# Patient Record
Sex: MALE | Race: WHITE | NOT HISPANIC OR LATINO
[De-identification: names, ages, dates, MRNs, and addresses within clinical notes are randomized per-mention and may not be internally consistent; named-entity substitution may affect disease eponyms.]

---

## 2021-12-10 PROBLEM — Z00.00 ENCOUNTER FOR PREVENTIVE HEALTH EXAMINATION: Status: ACTIVE | Noted: 2021-12-10

## 2021-12-16 ENCOUNTER — RESULT REVIEW (OUTPATIENT)
Age: 86
End: 2021-12-16

## 2021-12-20 ENCOUNTER — APPOINTMENT (OUTPATIENT)
Dept: CARDIOTHORACIC SURGERY | Facility: CLINIC | Age: 86
End: 2021-12-20

## 2022-01-03 ENCOUNTER — APPOINTMENT (OUTPATIENT)
Dept: CARDIOTHORACIC SURGERY | Facility: CLINIC | Age: 87
End: 2022-01-03
Payer: COMMERCIAL

## 2022-01-03 DIAGNOSIS — Z86.2 PERSONAL HISTORY OF DISEASES OF THE BLOOD AND BLOOD-FORMING ORGANS AND CERTAIN DISORDERS INVOLVING THE IMMUNE MECHANISM: ICD-10-CM

## 2022-01-03 DIAGNOSIS — Z86.79 PERSONAL HISTORY OF OTHER DISEASES OF THE CIRCULATORY SYSTEM: ICD-10-CM

## 2022-01-03 DIAGNOSIS — Z86.39 PERSONAL HISTORY OF OTHER ENDOCRINE, NUTRITIONAL AND METABOLIC DISEASE: ICD-10-CM

## 2022-01-03 DIAGNOSIS — Z85.038 PERSONAL HISTORY OF OTHER MALIGNANT NEOPLASM OF LARGE INTESTINE: ICD-10-CM

## 2022-01-03 DIAGNOSIS — Z86.718 PERSONAL HISTORY OF OTHER VENOUS THROMBOSIS AND EMBOLISM: ICD-10-CM

## 2022-01-03 PROCEDURE — 99202 OFFICE O/P NEW SF 15 MIN: CPT | Mod: 95

## 2022-01-03 RX ORDER — WARFARIN 5 MG/1
5 TABLET ORAL DAILY
Refills: 0 | Status: ACTIVE | COMMUNITY

## 2022-01-03 RX ORDER — FERUMOXYTOL 510 MG/17ML
510 INJECTION INTRAVENOUS
Refills: 0 | Status: ACTIVE | COMMUNITY

## 2022-01-03 RX ORDER — AMLODIPINE BESYLATE 5 MG/1
5 TABLET ORAL DAILY
Qty: 30 | Refills: 0 | Status: ACTIVE | COMMUNITY

## 2022-01-03 RX ORDER — LISINOPRIL 20 MG/1
20 TABLET ORAL DAILY
Qty: 3 | Refills: 3 | Status: ACTIVE | COMMUNITY

## 2022-01-03 RX ORDER — CYANOCOBALAMIN 1000 UG/ML
1000 INJECTION INTRAMUSCULAR; SUBCUTANEOUS
Qty: 4 | Refills: 3 | Status: ACTIVE | COMMUNITY

## 2022-01-03 RX ORDER — SUCRALFATE 1 G/1
1 TABLET ORAL 4 TIMES DAILY
Refills: 0 | Status: ACTIVE | COMMUNITY

## 2022-01-06 NOTE — HISTORY OF PRESENT ILLNESS
[FreeTextEntry1] : This visit was provided via telehealth using real-time 2-way audio visual technology. The patient, BERNARD CHENEY, was located at home, [address] [place] [address], at the time of the visit. The provider was located at [address] [place] [address]. The patient, BERNARD CHENEY, SHAHRAM WOLF, and Dr. Thompson all participated in the telehealth encounter. 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 \par \par The patient states he has been feeling fatigued with some dyspnea on exertion for the last few months.  His daughter states that he has less energy overall.  He denies chest pain, shortness of breath at rest, dizziness, syncope, orthopnea, PND, or LE edema.  \par \par The patient underwent a cardiac Catherization on 11/29/21 which showed normal coronaries.  The patient had an ECHO that showed severe aortic stenosis with a mean gradient of 44 mmHg.

## 2022-02-18 ENCOUNTER — NON-APPOINTMENT (OUTPATIENT)
Age: 87
End: 2022-02-18

## 2022-02-18 VITALS
DIASTOLIC BLOOD PRESSURE: 68 MMHG | HEART RATE: 73 BPM | OXYGEN SATURATION: 100 % | RESPIRATION RATE: 16 BRPM | SYSTOLIC BLOOD PRESSURE: 155 MMHG | WEIGHT: 180.34 LBS | TEMPERATURE: 98 F | HEIGHT: 67 IN

## 2022-02-18 NOTE — PATIENT PROFILE ADULT - FALL HARM RISK - HARM RISK INTERVENTIONS

## 2022-02-21 ENCOUNTER — TRANSCRIPTION ENCOUNTER (OUTPATIENT)
Age: 87
End: 2022-02-21

## 2022-02-22 ENCOUNTER — APPOINTMENT (OUTPATIENT)
Dept: CARDIOTHORACIC SURGERY | Facility: CLINIC | Age: 87
End: 2022-02-22
Payer: COMMERCIAL

## 2022-02-22 ENCOUNTER — APPOINTMENT (OUTPATIENT)
Dept: CARDIOTHORACIC SURGERY | Facility: HOSPITAL | Age: 87
End: 2022-02-22

## 2022-02-22 ENCOUNTER — INPATIENT (INPATIENT)
Facility: HOSPITAL | Age: 87
LOS: 0 days | Discharge: HOME HEALTH | DRG: 267 | End: 2022-02-23
Attending: INTERNAL MEDICINE | Admitting: INTERNAL MEDICINE
Payer: MEDICARE

## 2022-02-22 DIAGNOSIS — K25.9 GASTRIC ULCER, UNSPECIFIED AS ACUTE OR CHRONIC, WITHOUT HEMORRHAGE OR PERFORATION: Chronic | ICD-10-CM

## 2022-02-22 LAB
A1C WITH ESTIMATED AVERAGE GLUCOSE RESULT: 4.6 % — SIGNIFICANT CHANGE UP (ref 4–5.6)
ALBUMIN SERPL ELPH-MCNC: 3.1 G/DL — LOW (ref 3.3–5)
ALBUMIN SERPL ELPH-MCNC: 3.8 G/DL — SIGNIFICANT CHANGE UP (ref 3.3–5)
ALP SERPL-CCNC: 67 U/L — SIGNIFICANT CHANGE UP (ref 40–120)
ALP SERPL-CCNC: 76 U/L — SIGNIFICANT CHANGE UP (ref 40–120)
ALT FLD-CCNC: 17 U/L — SIGNIFICANT CHANGE UP (ref 10–45)
ALT FLD-CCNC: 20 U/L — SIGNIFICANT CHANGE UP (ref 10–45)
ANION GAP SERPL CALC-SCNC: 10 MMOL/L — SIGNIFICANT CHANGE UP (ref 5–17)
ANION GAP SERPL CALC-SCNC: 12 MMOL/L — SIGNIFICANT CHANGE UP (ref 5–17)
APPEARANCE UR: CLEAR — SIGNIFICANT CHANGE UP
APTT BLD: 31.4 SEC — SIGNIFICANT CHANGE UP (ref 27.5–35.5)
APTT BLD: 48 SEC — HIGH (ref 27.5–35.5)
AST SERPL-CCNC: 29 U/L — SIGNIFICANT CHANGE UP (ref 10–40)
AST SERPL-CCNC: 30 U/L — SIGNIFICANT CHANGE UP (ref 10–40)
BASOPHILS # BLD AUTO: 0.02 K/UL — SIGNIFICANT CHANGE UP (ref 0–0.2)
BASOPHILS NFR BLD AUTO: 0.3 % — SIGNIFICANT CHANGE UP (ref 0–2)
BILIRUB SERPL-MCNC: 1 MG/DL — SIGNIFICANT CHANGE UP (ref 0.2–1.2)
BILIRUB SERPL-MCNC: 1.5 MG/DL — HIGH (ref 0.2–1.2)
BILIRUB UR-MCNC: NEGATIVE — SIGNIFICANT CHANGE UP
BLD GP AB SCN SERPL QL: NEGATIVE — SIGNIFICANT CHANGE UP
BUN SERPL-MCNC: 12 MG/DL — SIGNIFICANT CHANGE UP (ref 7–23)
BUN SERPL-MCNC: 9 MG/DL — SIGNIFICANT CHANGE UP (ref 7–23)
CALCIUM SERPL-MCNC: 8.4 MG/DL — SIGNIFICANT CHANGE UP (ref 8.4–10.5)
CALCIUM SERPL-MCNC: 9.3 MG/DL — SIGNIFICANT CHANGE UP (ref 8.4–10.5)
CHLORIDE SERPL-SCNC: 95 MMOL/L — LOW (ref 96–108)
CHLORIDE SERPL-SCNC: 98 MMOL/L — SIGNIFICANT CHANGE UP (ref 96–108)
CO2 SERPL-SCNC: 22 MMOL/L — SIGNIFICANT CHANGE UP (ref 22–31)
CO2 SERPL-SCNC: 22 MMOL/L — SIGNIFICANT CHANGE UP (ref 22–31)
COLOR SPEC: YELLOW — SIGNIFICANT CHANGE UP
CREAT SERPL-MCNC: 0.73 MG/DL — SIGNIFICANT CHANGE UP (ref 0.5–1.3)
CREAT SERPL-MCNC: 0.97 MG/DL — SIGNIFICANT CHANGE UP (ref 0.5–1.3)
DIFF PNL FLD: NEGATIVE — SIGNIFICANT CHANGE UP
EOSINOPHIL # BLD AUTO: 0.14 K/UL — SIGNIFICANT CHANGE UP (ref 0–0.5)
EOSINOPHIL NFR BLD AUTO: 2.3 % — SIGNIFICANT CHANGE UP (ref 0–6)
ESTIMATED AVERAGE GLUCOSE: 85 MG/DL — SIGNIFICANT CHANGE UP (ref 68–114)
GAS PNL BLDA: SIGNIFICANT CHANGE UP
GLUCOSE SERPL-MCNC: 109 MG/DL — HIGH (ref 70–99)
GLUCOSE SERPL-MCNC: 99 MG/DL — SIGNIFICANT CHANGE UP (ref 70–99)
GLUCOSE UR QL: NEGATIVE — SIGNIFICANT CHANGE UP
HCT VFR BLD CALC: 35.6 % — LOW (ref 39–50)
HCT VFR BLD CALC: 37 % — LOW (ref 39–50)
HGB BLD-MCNC: 12.2 G/DL — LOW (ref 13–17)
HGB BLD-MCNC: 12.5 G/DL — LOW (ref 13–17)
IMM GRANULOCYTES NFR BLD AUTO: 0.5 % — SIGNIFICANT CHANGE UP (ref 0–1.5)
INR BLD: 1 — SIGNIFICANT CHANGE UP (ref 0.88–1.16)
INR BLD: 1.16 — SIGNIFICANT CHANGE UP (ref 0.88–1.16)
KETONES UR-MCNC: NEGATIVE — SIGNIFICANT CHANGE UP
LEUKOCYTE ESTERASE UR-ACNC: NEGATIVE — SIGNIFICANT CHANGE UP
LYMPHOCYTES # BLD AUTO: 0.63 K/UL — LOW (ref 1–3.3)
LYMPHOCYTES # BLD AUTO: 10.4 % — LOW (ref 13–44)
MAGNESIUM SERPL-MCNC: 1.6 MG/DL — SIGNIFICANT CHANGE UP (ref 1.6–2.6)
MAGNESIUM SERPL-MCNC: 1.7 MG/DL — SIGNIFICANT CHANGE UP (ref 1.6–2.6)
MCHC RBC-ENTMCNC: 33.5 PG — SIGNIFICANT CHANGE UP (ref 27–34)
MCHC RBC-ENTMCNC: 33.6 PG — SIGNIFICANT CHANGE UP (ref 27–34)
MCHC RBC-ENTMCNC: 33.8 GM/DL — SIGNIFICANT CHANGE UP (ref 32–36)
MCHC RBC-ENTMCNC: 34.3 GM/DL — SIGNIFICANT CHANGE UP (ref 32–36)
MCV RBC AUTO: 98.1 FL — SIGNIFICANT CHANGE UP (ref 80–100)
MCV RBC AUTO: 99.2 FL — SIGNIFICANT CHANGE UP (ref 80–100)
MONOCYTES # BLD AUTO: 0.44 K/UL — SIGNIFICANT CHANGE UP (ref 0–0.9)
MONOCYTES NFR BLD AUTO: 7.2 % — SIGNIFICANT CHANGE UP (ref 2–14)
NEUTROPHILS # BLD AUTO: 4.82 K/UL — SIGNIFICANT CHANGE UP (ref 1.8–7.4)
NEUTROPHILS NFR BLD AUTO: 79.3 % — HIGH (ref 43–77)
NITRITE UR-MCNC: NEGATIVE — SIGNIFICANT CHANGE UP
NRBC # BLD: 0 /100 WBCS — SIGNIFICANT CHANGE UP (ref 0–0)
NRBC # BLD: 0 /100 WBCS — SIGNIFICANT CHANGE UP (ref 0–0)
NT-PROBNP SERPL-SCNC: 348 PG/ML — HIGH (ref 0–300)
PH UR: 6.5 — SIGNIFICANT CHANGE UP (ref 5–8)
PHOSPHATE SERPL-MCNC: 2.8 MG/DL — SIGNIFICANT CHANGE UP (ref 2.5–4.5)
PHOSPHATE SERPL-MCNC: 2.8 MG/DL — SIGNIFICANT CHANGE UP (ref 2.5–4.5)
PLATELET # BLD AUTO: 161 K/UL — SIGNIFICANT CHANGE UP (ref 150–400)
PLATELET # BLD AUTO: 210 K/UL — SIGNIFICANT CHANGE UP (ref 150–400)
POTASSIUM SERPL-MCNC: 3.8 MMOL/L — SIGNIFICANT CHANGE UP (ref 3.5–5.3)
POTASSIUM SERPL-MCNC: 4.2 MMOL/L — SIGNIFICANT CHANGE UP (ref 3.5–5.3)
POTASSIUM SERPL-SCNC: 3.8 MMOL/L — SIGNIFICANT CHANGE UP (ref 3.5–5.3)
POTASSIUM SERPL-SCNC: 4.2 MMOL/L — SIGNIFICANT CHANGE UP (ref 3.5–5.3)
PROT SERPL-MCNC: 6 G/DL — SIGNIFICANT CHANGE UP (ref 6–8.3)
PROT SERPL-MCNC: 6.6 G/DL — SIGNIFICANT CHANGE UP (ref 6–8.3)
PROT UR-MCNC: NEGATIVE MG/DL — SIGNIFICANT CHANGE UP
PROTHROM AB SERPL-ACNC: 12 SEC — SIGNIFICANT CHANGE UP (ref 10.6–13.6)
PROTHROM AB SERPL-ACNC: 13.8 SEC — HIGH (ref 10.6–13.6)
RBC # BLD: 3.63 M/UL — LOW (ref 4.2–5.8)
RBC # BLD: 3.73 M/UL — LOW (ref 4.2–5.8)
RBC # FLD: 13.5 % — SIGNIFICANT CHANGE UP (ref 10.3–14.5)
RBC # FLD: 13.6 % — SIGNIFICANT CHANGE UP (ref 10.3–14.5)
RH IG SCN BLD-IMP: NEGATIVE — SIGNIFICANT CHANGE UP
RH IG SCN BLD-IMP: NEGATIVE — SIGNIFICANT CHANGE UP
SODIUM SERPL-SCNC: 129 MMOL/L — LOW (ref 135–145)
SODIUM SERPL-SCNC: 130 MMOL/L — LOW (ref 135–145)
SP GR SPEC: 1.01 — SIGNIFICANT CHANGE UP (ref 1–1.03)
TSH SERPL-MCNC: 2.89 UIU/ML — SIGNIFICANT CHANGE UP (ref 0.27–4.2)
UROBILINOGEN FLD QL: 0.2 E.U./DL — SIGNIFICANT CHANGE UP
WBC # BLD: 5.62 K/UL — SIGNIFICANT CHANGE UP (ref 3.8–10.5)
WBC # BLD: 6.08 K/UL — SIGNIFICANT CHANGE UP (ref 3.8–10.5)
WBC # FLD AUTO: 5.62 K/UL — SIGNIFICANT CHANGE UP (ref 3.8–10.5)
WBC # FLD AUTO: 6.08 K/UL — SIGNIFICANT CHANGE UP (ref 3.8–10.5)

## 2022-02-22 PROCEDURE — MCOT1: CPT | Mod: NC

## 2022-02-22 PROCEDURE — 33361 REPLACE AORTIC VALVE PERQ: CPT | Mod: 62,Q0

## 2022-02-22 PROCEDURE — 71045 X-RAY EXAM CHEST 1 VIEW: CPT | Mod: 26

## 2022-02-22 DEVICE — GUIDEWIRE GLIDEWIRE ANGLED TIP 0.035" X 180CM STIFF: Type: IMPLANTABLE DEVICE | Status: FUNCTIONAL

## 2022-02-22 DEVICE — INTRO FLEXOR CHECK RAABE 5FR X 55CM: Type: IMPLANTABLE DEVICE | Status: FUNCTIONAL

## 2022-02-22 DEVICE — CHEST DRAIN THORACIC PVC 32FR RIGHT ANGLE: Type: IMPLANTABLE DEVICE | Status: FUNCTIONAL

## 2022-02-22 DEVICE — SYS CEREBRAL PROT NCT04149535 SENTINEL FOR STUDY ONLY: Type: IMPLANTABLE DEVICE | Status: FUNCTIONAL

## 2022-02-22 DEVICE — SHEATH INTRODUCER TERUMO PINNACLE CORONARY 7FR X 10CM X 0.038" MINI WIRE: Type: IMPLANTABLE DEVICE | Status: FUNCTIONAL

## 2022-02-22 DEVICE — GWIRE GUID  0.035INX150CM: Type: IMPLANTABLE DEVICE | Status: FUNCTIONAL

## 2022-02-22 DEVICE — INTRODUCER SHEATH KIT GLIDESHEATH SLENDER FLEX STRAIGHT 21G 6F X 10CM: Type: IMPLANTABLE DEVICE | Status: FUNCTIONAL

## 2022-02-22 DEVICE — EMERALD GUIDEWIRE 0.35: Type: IMPLANTABLE DEVICE | Status: FUNCTIONAL

## 2022-02-22 DEVICE — CANNULA RETROGRADE CARDIOPLEGIA SELF-INFLATING 14FR PRE-SHAPED STYLET/HANDLE: Type: IMPLANTABLE DEVICE | Status: FUNCTIONAL

## 2022-02-22 DEVICE — CHEST DRAIN THORACIC PVC 32FR: Type: IMPLANTABLE DEVICE | Status: FUNCTIONAL

## 2022-02-22 DEVICE — CATH VENOUS CONN 29/37FRX15X0.5IN: Type: IMPLANTABLE DEVICE | Status: FUNCTIONAL

## 2022-02-22 DEVICE — INTRODUCER SHEATH DRYSEAL FLEX 18FR X 33CM: Type: IMPLANTABLE DEVICE | Status: FUNCTIONAL

## 2022-02-22 DEVICE — GUIDEWIRE STANDARD STRAIGHT .035" X 180CM: Type: IMPLANTABLE DEVICE | Status: FUNCTIONAL

## 2022-02-22 DEVICE — LUKENS BONE WAX 2.5G: Type: IMPLANTABLE DEVICE | Status: FUNCTIONAL

## 2022-02-22 DEVICE — IMPLANTABLE DEVICE: Type: IMPLANTABLE DEVICE | Status: FUNCTIONAL

## 2022-02-22 DEVICE — WIREGUIDE TOROFLEX .018X40CM: Type: IMPLANTABLE DEVICE | Status: FUNCTIONAL

## 2022-02-22 DEVICE — CANNULA AORTIC ROOT WITH VENT LINE 14G X 14CM FLANGED: Type: IMPLANTABLE DEVICE | Status: FUNCTIONAL

## 2022-02-22 DEVICE — INTRO MICROPUNC 4FRX10CM SS: Type: IMPLANTABLE DEVICE | Status: FUNCTIONAL

## 2022-02-22 DEVICE — CATH DX JR4 INFIN 5FRX100CM: Type: IMPLANTABLE DEVICE | Status: FUNCTIONAL

## 2022-02-22 DEVICE — SHEATH INTRODUCER TERUMO PINNACLE CORONARY 6FR X 10CM X 0.038" MINI WIRE: Type: IMPLANTABLE DEVICE | Status: FUNCTIONAL

## 2022-02-22 DEVICE — SHEATH INTRODUCER TERUMO PINNACLE PERIPHERAL 5FR X 10CM X 0.035" MINI WIRE: Type: IMPLANTABLE DEVICE | Status: FUNCTIONAL

## 2022-02-22 DEVICE — CATH DX AL3 INFIN 5FRX100CM: Type: IMPLANTABLE DEVICE | Status: FUNCTIONAL

## 2022-02-22 DEVICE — KIT PACE ELCTR BIPOLAR 5FR: Type: IMPLANTABLE DEVICE | Status: FUNCTIONAL

## 2022-02-22 DEVICE — SUT PERCLOSE PROGLIDE 6FR: Type: IMPLANTABLE DEVICE | Status: FUNCTIONAL

## 2022-02-22 DEVICE — SHEATH INTRODUCER TERUMO PINNACLE CORONARY 10FR X 10CM X 0.038" MINI WIRE: Type: IMPLANTABLE DEVICE | Status: FUNCTIONAL

## 2022-02-22 DEVICE — WIRE ASAHI GRAND SLAM 300CM: Type: IMPLANTABLE DEVICE | Status: FUNCTIONAL

## 2022-02-22 DEVICE — GWIRE VASC J TIP 035X260: Type: IMPLANTABLE DEVICE | Status: FUNCTIONAL

## 2022-02-22 DEVICE — SHEATH INTRO DRYSEAL FLEX 16FR 33CM: Type: IMPLANTABLE DEVICE | Status: FUNCTIONAL

## 2022-02-22 DEVICE — PACING CATH PACEL RIGHT HEART CURVE 5FR KIT: Type: IMPLANTABLE DEVICE | Status: FUNCTIONAL

## 2022-02-22 DEVICE — CHEST DRAIN THORACIC PVC 28FR RIGHT ANGLE: Type: IMPLANTABLE DEVICE | Status: FUNCTIONAL

## 2022-02-22 DEVICE — WIRE GD CONFIDA BRECKER CRV .035X260: Type: IMPLANTABLE DEVICE | Status: FUNCTIONAL

## 2022-02-22 DEVICE — SHEATH INTRO DRYSEAL FLEX 14FR 33CM: Type: IMPLANTABLE DEVICE | Status: FUNCTIONAL

## 2022-02-22 DEVICE — GUIDEWIRE AMPLATZ EXTRA-STIFF CURVED .035" X 260CM: Type: IMPLANTABLE DEVICE | Status: FUNCTIONAL

## 2022-02-22 DEVICE — CATH DX JL4 INFIN 5FRX100CM: Type: IMPLANTABLE DEVICE | Status: FUNCTIONAL

## 2022-02-22 DEVICE — CATH DX AL1 INFIN 5FRX100CM: Type: IMPLANTABLE DEVICE | Status: FUNCTIONAL

## 2022-02-22 DEVICE — WIRE GD BMW UNIV II 190CM: Type: IMPLANTABLE DEVICE | Status: FUNCTIONAL

## 2022-02-22 DEVICE — CATH DX PIG 145 INFIN 5FRX110CM: Type: IMPLANTABLE DEVICE | Status: FUNCTIONAL

## 2022-02-22 DEVICE — SHEATH INTRODUCER TERUMO PINNACLE CORONARY 8FR X 10CM X 0.038" MINI WIRE: Type: IMPLANTABLE DEVICE | Status: FUNCTIONAL

## 2022-02-22 DEVICE — CANNULA EASY FLOW AORTIC 23FR: Type: IMPLANTABLE DEVICE | Status: FUNCTIONAL

## 2022-02-22 DEVICE — GUIDEWIRE TERUMO GLIDEWIRE STIFF STRAGHT .035" X 180CM: Type: IMPLANTABLE DEVICE | Status: FUNCTIONAL

## 2022-02-22 DEVICE — PACING WIRE STREAMLINE BIPOLAR MYOCARDIAL: Type: IMPLANTABLE DEVICE | Status: FUNCTIONAL

## 2022-02-22 DEVICE — CATH VENT LEFT HEART SILICONE 20FR NON-VENTED: Type: IMPLANTABLE DEVICE | Status: FUNCTIONAL

## 2022-02-22 DEVICE — INTRO GWIRE 0.009-0.018IN: Type: IMPLANTABLE DEVICE | Status: FUNCTIONAL

## 2022-02-22 DEVICE — SHEATH INTRODUCER TERUMO PINNACLE CORONARY 5FR X 10CM X 0.038" MINI WIRE: Type: IMPLANTABLE DEVICE | Status: FUNCTIONAL

## 2022-02-22 RX ORDER — WARFARIN SODIUM 2.5 MG/1
5 TABLET ORAL ONCE
Refills: 0 | Status: DISCONTINUED | OUTPATIENT
Start: 2022-02-22 | End: 2022-02-22

## 2022-02-22 RX ORDER — ASPIRIN/CALCIUM CARB/MAGNESIUM 324 MG
81 TABLET ORAL DAILY
Refills: 0 | Status: DISCONTINUED | OUTPATIENT
Start: 2022-02-22 | End: 2022-02-22

## 2022-02-22 RX ORDER — HEPARIN SODIUM 5000 [USP'U]/ML
5000 INJECTION INTRAVENOUS; SUBCUTANEOUS EVERY 8 HOURS
Refills: 0 | Status: DISCONTINUED | OUTPATIENT
Start: 2022-02-22 | End: 2022-02-23

## 2022-02-22 RX ORDER — DEXTROSE 50 % IN WATER 50 %
25 SYRINGE (ML) INTRAVENOUS
Refills: 0 | Status: DISCONTINUED | OUTPATIENT
Start: 2022-02-22 | End: 2022-02-22

## 2022-02-22 RX ORDER — PREGABALIN 225 MG/1
0 CAPSULE ORAL
Qty: 0 | Refills: 0 | DISCHARGE

## 2022-02-22 RX ORDER — INSULIN HUMAN 100 [IU]/ML
1 INJECTION, SOLUTION SUBCUTANEOUS
Qty: 50 | Refills: 0 | Status: DISCONTINUED | OUTPATIENT
Start: 2022-02-22 | End: 2022-02-22

## 2022-02-22 RX ORDER — CEFAZOLIN SODIUM 1 G
2000 VIAL (EA) INJECTION EVERY 8 HOURS
Refills: 0 | Status: DISCONTINUED | OUTPATIENT
Start: 2022-02-22 | End: 2022-02-23

## 2022-02-22 RX ORDER — WARFARIN SODIUM 2.5 MG/1
5 TABLET ORAL ONCE
Refills: 0 | Status: COMPLETED | OUTPATIENT
Start: 2022-02-22 | End: 2022-02-22

## 2022-02-22 RX ORDER — DEXTROSE 50 % IN WATER 50 %
50 SYRINGE (ML) INTRAVENOUS
Refills: 0 | Status: DISCONTINUED | OUTPATIENT
Start: 2022-02-22 | End: 2022-02-22

## 2022-02-22 RX ORDER — PANTOPRAZOLE SODIUM 20 MG/1
40 TABLET, DELAYED RELEASE ORAL DAILY
Refills: 0 | Status: DISCONTINUED | OUTPATIENT
Start: 2022-02-22 | End: 2022-02-22

## 2022-02-22 RX ORDER — PANTOPRAZOLE SODIUM 20 MG/1
40 TABLET, DELAYED RELEASE ORAL
Refills: 0 | Status: DISCONTINUED | OUTPATIENT
Start: 2022-02-23 | End: 2022-02-23

## 2022-02-22 RX ORDER — AMLODIPINE BESYLATE 2.5 MG/1
5 TABLET ORAL DAILY
Refills: 0 | Status: DISCONTINUED | OUTPATIENT
Start: 2022-02-22 | End: 2022-02-23

## 2022-02-22 RX ORDER — FERUMOXYTOL 510 MG/17ML
510 INJECTION INTRAVENOUS
Qty: 0 | Refills: 0 | DISCHARGE

## 2022-02-22 RX ORDER — POTASSIUM CHLORIDE 20 MEQ
20 PACKET (EA) ORAL ONCE
Refills: 0 | Status: COMPLETED | OUTPATIENT
Start: 2022-02-22 | End: 2022-02-22

## 2022-02-22 RX ORDER — MAGNESIUM SULFATE 500 MG/ML
2 VIAL (ML) INJECTION ONCE
Refills: 0 | Status: COMPLETED | OUTPATIENT
Start: 2022-02-22 | End: 2022-02-22

## 2022-02-22 RX ORDER — ACETAMINOPHEN 500 MG
650 TABLET ORAL EVERY 6 HOURS
Refills: 0 | Status: DISCONTINUED | OUTPATIENT
Start: 2022-02-22 | End: 2022-02-23

## 2022-02-22 RX ADMIN — Medication 100 MILLIGRAM(S): at 22:49

## 2022-02-22 RX ADMIN — PANTOPRAZOLE SODIUM 40 MILLIGRAM(S): 20 TABLET, DELAYED RELEASE ORAL at 14:41

## 2022-02-22 RX ADMIN — WARFARIN SODIUM 5 MILLIGRAM(S): 2.5 TABLET ORAL at 22:43

## 2022-02-22 RX ADMIN — AMLODIPINE BESYLATE 5 MILLIGRAM(S): 2.5 TABLET ORAL at 20:47

## 2022-02-22 RX ADMIN — Medication 25 GRAM(S): at 14:42

## 2022-02-22 RX ADMIN — HEPARIN SODIUM 5000 UNIT(S): 5000 INJECTION INTRAVENOUS; SUBCUTANEOUS at 22:43

## 2022-02-22 RX ADMIN — Medication 100 MILLIGRAM(S): at 14:42

## 2022-02-22 RX ADMIN — Medication 81 MILLIGRAM(S): at 10:44

## 2022-02-22 RX ADMIN — Medication 20 MILLIEQUIVALENT(S): at 15:17

## 2022-02-22 NOTE — H&P ADULT - NSHPPHYSICALEXAM_GEN_ALL_CORE
Appearance: No acute distress.  Neurologic: AAOx3, no AMS or focal deficits.  Responds appropriately to verbal and physical stimuli; exhibits purposeful movement in all extremities.  HEENT:   MMM, PERRLA, EOMI	b/l  Neck: Supple, + JVD/ - JVD; +/- Carotid Bruit   Cardiovascular: RRR, S1 S2. +murmur  Respiratory: No acute respiratory distress. CTA b/l, no w/r/r.   Gastrointestinal:  Soft, non-tender, non-distended, + BS.	  Skin: No rashes. No ecchymoses. No cyanosis.  Extremities: Exhibits normal range of motion, no clubbing, cyanosis or edema.  Vascular: Peripheral pulses palpable 2+ bilaterally. Appearance: No acute distress.  Neurologic: AAOx3, no AMS or focal deficits.  Responds appropriately to verbal and physical stimuli; exhibits purposeful movement in all extremities.  HEENT:   MMM, PERRLA, EOMI	b/l  Neck: Supple, + JVD/ - JVD; +/- Carotid Bruit   Cardiovascular: RRR, S1 S2. +murmur  Respiratory: No acute respiratory distress. CTA b/l, no w/r/r.   Gastrointestinal:  Soft, non-tender, non-distended, + BS.	  Skin: No rashes. No ecchymoses. No cyanosis.  Extremities: Exhibits normal range of motion, no clubbing, cyanosis or edema.  Vascular: Peripheral pulses doppler for b/l DP, no PT signals noted b/l

## 2022-02-22 NOTE — BRIEF OPERATIVE NOTE - OPERATION/FINDINGS
TF TAVR (29mm Armando)  EF normal    Widened QRS post-procedure, TVP left in place     Conscious sedation    Bilateral groin access:  Right femoral arterial access perclosed  Left femoral arterial access perclosed  Left femoral venous TVP in place    Arrived to CTICU extubated in stable condition on no gtt

## 2022-02-22 NOTE — PROGRESS NOTE ADULT - ASSESSMENT
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 Shad Ortiz after he presented with increasing fatigue and SMITH over the last 6 months.  Cardiac cath on 11/29/21 showed normal coronaries and ECHO done which confirmed severe AS with BRENDA 0.9cm2, MPG/PPG 44/78mmHg).  He completed pre-procedural evaluation and was deemed a TAVR candidate and was admitted to [facility] on 2/22/22 and underwent TAVR (29mm Armando). Brought to [address] (mini-ICU bed) for recovery. Left groin TVP in place. Left radial arterial line in place. Initial EKG showed sinus with PVCs. . PEPE 186. Rate 61bpm. Labs unremarkable. Hemodynamically stable.      A/P:  Neurovascular:   - No delirium. Pain well controlled    Cardiovascular:   - POD # 0 from TAVR  - Hemodynamically stable. HR controlled, NSR 70s  - Has TVP backup VVI 40. Planning to remove ~5:30pm. Will obtain EKG prior to removal  - Hx of HTN, BP controlled  - Restart home medications when needed  - Monotherapy with Coumadin 5mg, dose tonight at 10pm  - EKG and TTE 2/23    Respiratory:   - 02 Sat = 98% on RA.  - Encourage C+DB and Use of IS 10x / hr while awake.    GI:   - Stable.  - Tolerating DASH diet  - Continue GI PPX with protonix    Renal / :  - BUN/Cr stable  - Continue to monitor renal function.  - Monitor I/O's.  - Replete electrolytes PRN    Endocrine:    -A1c 4.8  -TSH.    Hematologic:  - H/H stable with no obvious signs of bleeding  - Coagulation Panel sent, PTT 48, INR 1.0  - Last dose coumadin 2/17  - OK to restart Coumadin 5mg tonight 10pm    ID:  - Pt remains afebrile with no elevation in WBC or signs of infection  - Continue to monitor CBC  - Observe for SIRS/Sepsis Syndrome.    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 Shad Ortiz after he presented with increasing fatigue and SMITH over the last 6 months.  Cardiac cath on 11/29/21 showed normal coronaries and ECHO done which confirmed severe AS with BRENDA 0.9cm2, MPG/PPG 44/78mmHg).  He completed pre-procedural evaluation and was deemed a TAVR candidate and was admitted to [facility] on 2/22/22 and underwent TAVR (29mm Armando). Brought to [address] (mini-ICU bed) for recovery. Left groin TVP in place. Left radial arterial line in place. Initial EKG showed sinus with PVCs. . PEPE 186. Rate 61bpm. Labs unremarkable. Hemodynamically stable.      A/P:  Neurovascular:   - No delirium. Pain well controlled    Cardiovascular:   - POD # 0 from TAVR  - Hemodynamically stable. HR controlled, NSR 70s  - Has TVP backup VVI 40. Planning to remove ~5:30pm. Will obtain EKG prior to removal  - Hx of HTN, BP controlled  - Restart home medications when needed  - Monotherapy with Coumadin 5mg, dose tonight at 10pm  - EKG and TTE 2/23    Respiratory:   - 02 Sat = 98% on RA.  - Encourage C+DB and Use of IS 10x / hr while awake.    GI:   - Stable.  - Tolerating DASH diet  - Continue GI PPX with protonix    Renal / :  - BUN/Cr stable  - Continue to monitor renal function.  - Monitor I/O's.  - Replete electrolytes PRN    Endocrine:    -A1c 4.8  -TSH.    Hematologic: HX DVT/PE, on coumadin monotherapy  - H/H stable with no obvious signs of bleeding  - Coagulation Panel sent, PTT 48, INR 1.0  - Last dose coumadin 2/17  - OK to restart Coumadin 5mg tonight 10pm    ID:  - Pt remains afebrile with no elevation in WBC or signs of infection  - Continue to monitor CBC  - Observe for SIRS/Sepsis Syndrome.    Disposition:  -Home after TTE and EKG 2/23

## 2022-02-22 NOTE — H&P ADULT - HISTORY OF PRESENT ILLNESS
This is an 87 year old male, former smoker, with history of HTN, HLD, colon CA, DVT (on Coumadin), chronic HFpEF with Severe AS referred by Dr. Shad Ortiz after he presented with increasing fatigue and SMITH over the last 6 months.  Cardiac cath on 11/29/21 showed normal coronaries and ECHO done which confirmed severe AS with BRENDA 0.9cm2, MPG/PPG 44/78mmHg).  He completed pre-procedural evaluation and was deemed a TAVR candidate and was admitted to [facility] on 2/22/22 for planned TAVR.      Patient seen in same day holding area; Reports no changes to PMHx or medications since last seen by our team. Denies acute or current SOB, chest pain, palpitation, N/V/D, fever/chills, recent illness, or any other concerning symptoms.    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 Shad Ortiz after he presented with increasing fatigue and SMITH over the last 6 months.  Cardiac cath on 11/29/21 showed normal coronaries and ECHO done which confirmed severe AS with BRENDA 0.9cm2, MPG/PPG 44/78mmHg).  He completed pre-procedural evaluation and was deemed a TAVR candidate and was admitted to [facility] on 2/22/22 for planned TAVR.      Patient seen in same day holding area; Reports no changes to PMHx or medications since last seen by our team. Denies acute or current SOB, chest pain, palpitation, N/V/D, fever/chills, recent illness, or any other concerning symptoms.

## 2022-02-22 NOTE — H&P ADULT - ASSESSMENT
87 year old male, former smoker, with history of HTN, HLD, colon CA, DVT (on Coumadin), chronic HFpEF with Severe AS referred by Dr. Shad Ortiz after he presented with increasing fatigue and SMITH over the last 6 months.  Cardiac cath on 11/29/21 showed normal coronaries and ECHO done which confirmed severe AS with BRENDA 0.9cm2, MPG/PPG 44/78mmHg).  He completed pre-procedural evaluation and was deemed a TAVR candidate and was admitted to [facility] on 2/22/22 for planned TAVR.      Plan:   1. Severe AS  -Admit under Dr. Mckeon via same day surgery. Consent signed, placed on chart.  Risks/benefits reviewed, patient understands and agrees. T&S ordered and blood products placed on hold for OR.  To [facility] ICU  post-op.   -Resume appropriate home meds post-op  -ECHO/EKG post-op   87 year old male, former smoker, with history of HTN, HLD, colon CA, DVT (on Coumadin), chronic HFpEF with Severe AS referred by Dr. Shad Ortiz after he presented with increasing fatigue and SMITH over the last 6 months.  Cardiac cath on 11/29/21 showed normal coronaries and ECHO done which confirmed severe AS with BRENDA 0.9cm2, MPG/PPG 44/78mmHg).  He completed pre-procedural evaluation and was deemed a TAVR candidate and was admitted to [facility] on 2/22/22 for planned TAVR.      Plan:   1. Severe AS  -Admit under Dr. Mckeon via same day surgery. Consent signed, placed on chart.  Risks/benefits reviewed, patient understands and agrees. T&S ordered and blood products placed on hold for OR.  To [facility] ICU  post-op.   -Resume appropriate home meds post-op  -Will confirm DAPT therapy with Dr. Mckeon   -ECHO/EKG post-op

## 2022-02-22 NOTE — H&P ADULT - NSHPSOCIALHISTORY_GEN_ALL_CORE
SOCIAL HISTORY:  Smoker:  YES / NO        PACK YEARS:                         WHEN QUIT?  ETOH use:  YES / NO               FREQUENCY / QUANTITY:  Ilicit Drug use:  YES / NO  Occupation:  Assisted device use (Cane / Walker):  Live with: SOCIAL HISTORY:  Smoker: Former remote smoker, quit 40 years ago  ETOH use:  YES - 3-4 beers/day, denies episodes of withdrawal   Ilicit Drug use: NO  Occupation: Retired   Assisted device use (Cane / Walker): Able to perform ADL's unassisted   Live with: Alone

## 2022-02-22 NOTE — BRIEF OPERATIVE NOTE - COMMENTS
Dr. Thompson was the first assistant for this case including but not limited to positioning, access, TAVR, closure.     I was present for this procedure and participated as first assistant as described by the PA above, unless otherwise noted below.

## 2022-02-22 NOTE — PROGRESS NOTE ADULT - SUBJECTIVE AND OBJECTIVE BOX
Patient discussed on morning rounds with Dr. Saravia    Operation / Date: 22 TAVR (29mm Armando)    SUBJECTIVE ASSESSMENT:  87y Male seen and examined at bedside this afternoon. S/p TAVR. Denies CP/SOB/N/V/D/dizziness/cough/fever/chills.      Vital Signs Last 24 Hrs  T(C): 36.1 (2022 15:06), Max: 36.1 (2022 15:06)  T(F): 97 (2022 15:06), Max: 97 (2022 15:06)  HR: 58 (2022 15:45) (58 - 68)  BP: --  BP(mean): --  RR: 18 (2022 15:45) (16 - 18)  SpO2: 100% (2022 15:45) (100% - 100%)  I&O's Detail    2022 07:01  -  2022 16:19  --------------------------------------------------------  IN:    IV PiggyBack: 100 mL    Oral Fluid: 50 mL  Total IN: 150 mL    OUT:    Voided (mL): 220 mL  Total OUT: 220 mL    Total NET: -70 mL      CHEST TUBE:  NO  HALLIE DRAIN:  NO  EPICARDIAL WIRES: YNO  TIE DOWNS: NO  GROVES: NO      PHYSICAL EXAM:  GEN: NAD, looks comfortable  Psych: Mood appropriate  Neuro: A&Ox3.  No focal deficits.  Moving all extremities.   HEENT: No obvious abnormalities  CV: S1S2, regular, + murmur.  No carotid bruits.  No JVD  Lungs: Clear B/L.  No wheezing, rales or rhonchi  ABD: Soft, non-tender, non-distended.  +Bowel sounds  EXT: Warm and well perfused.  No peripheral edema noted  Musculoskeletal: Moving all extremities with normal ROM, no joint swelling  PV: Pedal pulses palpable  Skin: Bruising on bilateral forearms and hands (chronic)  Lines: Central line with TVP (backup VVI 40) left groin.       LABS:                        12.2   6.08  )-----------( 161      ( 2022 13:47 )             35.6       COUMADIN:  Yes/No. REASON: .    PT/INR - ( 2022 13:47 )   PT: 13.8 sec;   INR: 1.16          PTT - ( 2022 13:47 )  PTT:48.0 sec        130<L>  |  98  |  9   ----------------------------<  109<H>  3.8   |  22  |  0.73    Ca    8.4      2022 13:47  Phos  2.8       Mg     1.6         TPro  6.0  /  Alb  3.1<L>  /  TBili  1.0  /  DBili  x   /  AST  29  /  ALT  17  /  AlkPhos  67  22      Urinalysis Basic - ( 2022 10:30 )    Color: Yellow / Appearance: Clear / S.010 / pH: x  Gluc: x / Ketone: NEGATIVE  / Bili: Negative / Urobili: 0.2 E.U./dL   Blood: x / Protein: NEGATIVE mg/dL / Nitrite: NEGATIVE   Leuk Esterase: NEGATIVE / RBC: x / WBC x   Sq Epi: x / Non Sq Epi: x / Bacteria: x        MEDICATIONS  (STANDING):  ceFAZolin   IVPB 2000 milliGRAM(s) IV Intermittent every 8 hours  dextrose 50% Injectable 50 milliLiter(s) IV Push every 15 minutes  dextrose 50% Injectable 25 milliLiter(s) IV Push every 15 minutes  heparin   Injectable 5000 Unit(s) SubCutaneous every 8 hours  insulin regular Infusion 1 Unit(s)/Hr (1 mL/Hr) IV Continuous <Continuous>  warfarin 5 milliGRAM(s) Oral once    MEDICATIONS  (PRN):        RADIOLOGY & ADDITIONAL TESTS:     Patient discussed on morning rounds with Dr. Saravia    Operation / Date: 22 TAVR (29mm Armando)    SUBJECTIVE ASSESSMENT:  87y Male seen and examined at bedside this afternoon. S/p TAVR. Denies CP/SOB/N/V/D/dizziness/cough/fever/chills.      Vital Signs Last 24 Hrs  T(C): 36.1 (2022 15:06), Max: 36.1 (2022 15:06)  T(F): 97 (2022 15:06), Max: 97 (2022 15:06)  HR: 58 (2022 15:45) (58 - 68)  BP: --  BP(mean): --  RR: 18 (2022 15:45) (16 - 18)  SpO2: 100% (2022 15:45) (100% - 100%)  I&O's Detail    2022 07:01  -  2022 16:19  --------------------------------------------------------  IN:    IV PiggyBack: 100 mL    Oral Fluid: 50 mL  Total IN: 150 mL    OUT:    Voided (mL): 220 mL  Total OUT: 220 mL    Total NET: -70 mL      CHEST TUBE:  NO  HALLIE DRAIN:  NO  EPICARDIAL WIRES: YNO  TIE DOWNS: NO  GROVES: NO      PHYSICAL EXAM:  GEN: NAD, looks comfortable  Psych: Mood appropriate  Neuro: A&Ox3.  No focal deficits.  Moving all extremities.   HEENT: No obvious abnormalities  CV: S1S2, regular, + murmur.  No carotid bruits.  No JVD  Lungs: Clear B/L.  No wheezing, rales or rhonchi  ABD: Soft, non-tender, non-distended.  +Bowel sounds  EXT: Warm and well perfused.  No peripheral edema noted  Musculoskeletal: Moving all extremities with normal ROM, no joint swelling  PV: Pedal pulses palpable  Skin: Bruising on bilateral forearms and hands (chronic)  Lines: Central line with TVP (backup VVI 40) left groin. Left radial arterial line in place.      LABS:                        12.2   6.08  )-----------( 161      ( 2022 13:47 )             35.6       COUMADIN:  Yes/No. REASON: .    PT/INR - ( 2022 13:47 )   PT: 13.8 sec;   INR: 1.16          PTT - ( 2022 13:47 )  PTT:48.0 sec        130<L>  |  98  |  9   ----------------------------<  109<H>  3.8   |  22  |  0.73    Ca    8.4      2022 13:47  Phos  2.8       Mg     1.6         TPro  6.0  /  Alb  3.1<L>  /  TBili  1.0  /  DBili  x   /  AST  29  /  ALT  17  /  AlkPhos  67  02-22      Urinalysis Basic - ( 2022 10:30 )    Color: Yellow / Appearance: Clear / S.010 / pH: x  Gluc: x / Ketone: NEGATIVE  / Bili: Negative / Urobili: 0.2 E.U./dL   Blood: x / Protein: NEGATIVE mg/dL / Nitrite: NEGATIVE   Leuk Esterase: NEGATIVE / RBC: x / WBC x   Sq Epi: x / Non Sq Epi: x / Bacteria: x        MEDICATIONS  (STANDING):  ceFAZolin   IVPB 2000 milliGRAM(s) IV Intermittent every 8 hours  dextrose 50% Injectable 50 milliLiter(s) IV Push every 15 minutes  dextrose 50% Injectable 25 milliLiter(s) IV Push every 15 minutes  heparin   Injectable 5000 Unit(s) SubCutaneous every 8 hours  insulin regular Infusion 1 Unit(s)/Hr (1 mL/Hr) IV Continuous <Continuous>  warfarin 5 milliGRAM(s) Oral once    MEDICATIONS  (PRN):        RADIOLOGY & ADDITIONAL TESTS:  EKG (1:51PM) Sinus rhythm with PVCs. . PEPE 186. Rate 61bpm   Patient discussed on morning rounds with Dr. Saravia    Operation / Date: 22 TAVR (29mm Armando)    SUBJECTIVE ASSESSMENT:  87y Male seen and examined at bedside this afternoon. S/p TAVR. Denies CP/SOB/N/V/D/dizziness/cough/fever/chills.      Vital Signs Last 24 Hrs  T(C): 36.1 (2022 15:06), Max: 36.1 (2022 15:06)  T(F): 97 (2022 15:06), Max: 97 (2022 15:06)  HR: 58 (2022 15:45) (58 - 68)  BP: --  BP(mean): --  RR: 18 (2022 15:45) (16 - 18)  SpO2: 100% (2022 15:45) (100% - 100%)  I&O's Detail    2022 07:01  -  2022 16:19  --------------------------------------------------------  IN:    IV PiggyBack: 100 mL    Oral Fluid: 50 mL  Total IN: 150 mL    OUT:    Voided (mL): 220 mL  Total OUT: 220 mL    Total NET: -70 mL      CHEST TUBE:  NO  HALLIE DRAIN:  NO  EPICARDIAL WIRES: YNO  TIE DOWNS: NO  GROVES: NO      PHYSICAL EXAM:  GEN: NAD, looks comfortable  Psych: Mood appropriate  Neuro: A&Ox3.  No focal deficits.  Moving all extremities.   HEENT: No obvious abnormalities  CV: S1S2, regular, + murmur.  No carotid bruits.  No JVD  Lungs: Clear B/L.  No wheezing, rales or rhonchi  ABD: Soft, non-tender, non-distended.  +Bowel sounds  EXT: Warm and well perfused.  No peripheral edema noted  Musculoskeletal: Moving all extremities with normal ROM, no joint swelling  PV: Pedal pulses palpable  Skin: Bruising on bilateral forearms and hands (chronic)  Lines: Central line with TVP (backup VVI 40) left groin. Left radial arterial line in place.      LABS:                        12.2   6.08  )-----------( 161      ( 2022 13:47 )             35.6       COUMADIN:  YES, FOR HX DVT/PE      PT/INR - ( 2022 13:47 )   PT: 13.8 sec;   INR: 1.16          PTT - ( 2022 13:47 )  PTT:48.0 sec        130<L>  |  98  |  9   ----------------------------<  109<H>  3.8   |  22  |  0.73    Ca    8.4      2022 13:47  Phos  2.8       Mg     1.6         TPro  6.0  /  Alb  3.1<L>  /  TBili  1.0  /  DBili  x   /  AST  29  /  ALT  17  /  AlkPhos  67  02-22      Urinalysis Basic - ( 2022 10:30 )    Color: Yellow / Appearance: Clear / S.010 / pH: x  Gluc: x / Ketone: NEGATIVE  / Bili: Negative / Urobili: 0.2 E.U./dL   Blood: x / Protein: NEGATIVE mg/dL / Nitrite: NEGATIVE   Leuk Esterase: NEGATIVE / RBC: x / WBC x   Sq Epi: x / Non Sq Epi: x / Bacteria: x        MEDICATIONS  (STANDING):  ceFAZolin   IVPB 2000 milliGRAM(s) IV Intermittent every 8 hours  dextrose 50% Injectable 50 milliLiter(s) IV Push every 15 minutes  dextrose 50% Injectable 25 milliLiter(s) IV Push every 15 minutes  heparin   Injectable 5000 Unit(s) SubCutaneous every 8 hours  insulin regular Infusion 1 Unit(s)/Hr (1 mL/Hr) IV Continuous <Continuous>  warfarin 5 milliGRAM(s) Oral once    MEDICATIONS  (PRN):        RADIOLOGY & ADDITIONAL TESTS:  EKG (1:51PM) Sinus rhythm with PVCs. . PEPE 186. Rate 61bpm

## 2022-02-22 NOTE — H&P ADULT - NSHPREVIEWOFSYSTEMS_GEN_ALL_CORE
Review of Systems  CONSTITUTIONAL:  +fatigue; Denies Fevers / chills, sweats, weight loss, weight gain                                      NEURO:  Denies paresthesia, seizures, syncope, confusion                                                                                EYES:  Denies Blurry vision, discharge, pain, loss of vision                                                                                    ENMT:  Denies Difficulty hearing, vertigo, dysphagia, epistaxis, recent dental work                                       CV:  +SMITH; Denies Chest pain, palpitations, orthopnea                                                                                          RESPIRATORY:  Denies Wheezing, SOB, cough / sputum, hemoptysis                                                                GI:  Denies Nausea, vomiting, diarrhea, constipation, melena, difficulty swallowing                                               : Denies Hematuria, dysuria, urgency, incontinence                                                                                         MUSCULOSKELETAL:  Denies arthritis, joint swelling, muscle weakness                                                             SKIN/BREAST:  Denies rash, itching, hair loss, masses                                                                                            PSYCH:  Denies depression, anxiety, suicidal ideation                                                                                               HEME/LYMPH:  Denies bruises easily, enlarged lymph nodes, tender lymph nodes                                        ENDOCRINE:  Denies cold intolerance, heat intolerance, polydipsia

## 2022-02-23 ENCOUNTER — TRANSCRIPTION ENCOUNTER (OUTPATIENT)
Age: 87
End: 2022-02-23

## 2022-02-23 VITALS
RESPIRATION RATE: 18 BRPM | HEART RATE: 69 BPM | DIASTOLIC BLOOD PRESSURE: 63 MMHG | SYSTOLIC BLOOD PRESSURE: 149 MMHG | OXYGEN SATURATION: 97 %

## 2022-02-23 PROBLEM — I10 ESSENTIAL (PRIMARY) HYPERTENSION: Chronic | Status: ACTIVE | Noted: 2022-02-18

## 2022-02-23 PROBLEM — I26.99 OTHER PULMONARY EMBOLISM WITHOUT ACUTE COR PULMONALE: Chronic | Status: ACTIVE | Noted: 2022-02-18

## 2022-02-23 PROBLEM — C18.9 MALIGNANT NEOPLASM OF COLON, UNSPECIFIED: Chronic | Status: ACTIVE | Noted: 2022-02-18

## 2022-02-23 LAB
ANION GAP SERPL CALC-SCNC: 9 MMOL/L — SIGNIFICANT CHANGE UP (ref 5–17)
APTT BLD: 31.3 SEC — SIGNIFICANT CHANGE UP (ref 27.5–35.5)
BUN SERPL-MCNC: 12 MG/DL — SIGNIFICANT CHANGE UP (ref 7–23)
CALCIUM SERPL-MCNC: 7.8 MG/DL — LOW (ref 8.4–10.5)
CHLORIDE SERPL-SCNC: 99 MMOL/L — SIGNIFICANT CHANGE UP (ref 96–108)
CO2 SERPL-SCNC: 22 MMOL/L — SIGNIFICANT CHANGE UP (ref 22–31)
CREAT SERPL-MCNC: 0.86 MG/DL — SIGNIFICANT CHANGE UP (ref 0.5–1.3)
GAS PNL BLDA: SIGNIFICANT CHANGE UP
GLUCOSE SERPL-MCNC: 88 MG/DL — SIGNIFICANT CHANGE UP (ref 70–99)
HCT VFR BLD CALC: 30.7 % — LOW (ref 39–50)
HGB BLD-MCNC: 10.2 G/DL — LOW (ref 13–17)
INR BLD: 1.06 — SIGNIFICANT CHANGE UP (ref 0.88–1.16)
MAGNESIUM SERPL-MCNC: 1.8 MG/DL — SIGNIFICANT CHANGE UP (ref 1.6–2.6)
MCHC RBC-ENTMCNC: 32.6 PG — SIGNIFICANT CHANGE UP (ref 27–34)
MCHC RBC-ENTMCNC: 33.2 GM/DL — SIGNIFICANT CHANGE UP (ref 32–36)
MCV RBC AUTO: 98.1 FL — SIGNIFICANT CHANGE UP (ref 80–100)
NRBC # BLD: 0 /100 WBCS — SIGNIFICANT CHANGE UP (ref 0–0)
PHOSPHATE SERPL-MCNC: 2.9 MG/DL — SIGNIFICANT CHANGE UP (ref 2.5–4.5)
PLATELET # BLD AUTO: 151 K/UL — SIGNIFICANT CHANGE UP (ref 150–400)
POTASSIUM SERPL-MCNC: 4.3 MMOL/L — SIGNIFICANT CHANGE UP (ref 3.5–5.3)
POTASSIUM SERPL-SCNC: 4.3 MMOL/L — SIGNIFICANT CHANGE UP (ref 3.5–5.3)
PROTHROM AB SERPL-ACNC: 12.7 SEC — SIGNIFICANT CHANGE UP (ref 10.6–13.6)
RBC # BLD: 3.13 M/UL — LOW (ref 4.2–5.8)
RBC # FLD: 13.4 % — SIGNIFICANT CHANGE UP (ref 10.3–14.5)
SODIUM SERPL-SCNC: 130 MMOL/L — LOW (ref 135–145)
WBC # BLD: 6.33 K/UL — SIGNIFICANT CHANGE UP (ref 3.8–10.5)
WBC # FLD AUTO: 6.33 K/UL — SIGNIFICANT CHANGE UP (ref 3.8–10.5)

## 2022-02-23 PROCEDURE — 84100 ASSAY OF PHOSPHORUS: CPT

## 2022-02-23 PROCEDURE — 85025 COMPLETE CBC W/AUTO DIFF WBC: CPT

## 2022-02-23 PROCEDURE — 84295 ASSAY OF SERUM SODIUM: CPT

## 2022-02-23 PROCEDURE — 93306 TTE W/DOPPLER COMPLETE: CPT

## 2022-02-23 PROCEDURE — 82803 BLOOD GASES ANY COMBINATION: CPT

## 2022-02-23 PROCEDURE — 85610 PROTHROMBIN TIME: CPT

## 2022-02-23 PROCEDURE — 81003 URINALYSIS AUTO W/O SCOPE: CPT

## 2022-02-23 PROCEDURE — 86850 RBC ANTIBODY SCREEN: CPT

## 2022-02-23 PROCEDURE — 36415 COLL VENOUS BLD VENIPUNCTURE: CPT

## 2022-02-23 PROCEDURE — 93306 TTE W/DOPPLER COMPLETE: CPT | Mod: 26

## 2022-02-23 PROCEDURE — 83036 HEMOGLOBIN GLYCOSYLATED A1C: CPT

## 2022-02-23 PROCEDURE — C1894: CPT

## 2022-02-23 PROCEDURE — 93308 TTE F-UP OR LMTD: CPT | Mod: 26,59

## 2022-02-23 PROCEDURE — 83880 ASSAY OF NATRIURETIC PEPTIDE: CPT

## 2022-02-23 PROCEDURE — 80053 COMPREHEN METABOLIC PANEL: CPT

## 2022-02-23 PROCEDURE — 86900 BLOOD TYPING SEROLOGIC ABO: CPT

## 2022-02-23 PROCEDURE — 84443 ASSAY THYROID STIM HORMONE: CPT

## 2022-02-23 PROCEDURE — 84132 ASSAY OF SERUM POTASSIUM: CPT

## 2022-02-23 PROCEDURE — 85730 THROMBOPLASTIN TIME PARTIAL: CPT

## 2022-02-23 PROCEDURE — C1887: CPT

## 2022-02-23 PROCEDURE — C1760: CPT

## 2022-02-23 PROCEDURE — 85027 COMPLETE CBC AUTOMATED: CPT

## 2022-02-23 PROCEDURE — 86923 COMPATIBILITY TEST ELECTRIC: CPT

## 2022-02-23 PROCEDURE — C1769: CPT

## 2022-02-23 PROCEDURE — C1889: CPT

## 2022-02-23 PROCEDURE — 80048 BASIC METABOLIC PNL TOTAL CA: CPT

## 2022-02-23 PROCEDURE — 86901 BLOOD TYPING SEROLOGIC RH(D): CPT

## 2022-02-23 PROCEDURE — 82330 ASSAY OF CALCIUM: CPT

## 2022-02-23 PROCEDURE — 93005 ELECTROCARDIOGRAM TRACING: CPT

## 2022-02-23 PROCEDURE — 71045 X-RAY EXAM CHEST 1 VIEW: CPT

## 2022-02-23 PROCEDURE — 93010 ELECTROCARDIOGRAM REPORT: CPT

## 2022-02-23 PROCEDURE — 83735 ASSAY OF MAGNESIUM: CPT

## 2022-02-23 PROCEDURE — 71045 X-RAY EXAM CHEST 1 VIEW: CPT | Mod: 26

## 2022-02-23 RX ORDER — SODIUM CHLORIDE 9 MG/ML
3 INJECTION INTRAMUSCULAR; INTRAVENOUS; SUBCUTANEOUS EVERY 8 HOURS
Refills: 0 | Status: DISCONTINUED | OUTPATIENT
Start: 2022-02-23 | End: 2022-02-23

## 2022-02-23 RX ORDER — LISINOPRIL 2.5 MG/1
20 TABLET ORAL DAILY
Refills: 0 | Status: DISCONTINUED | OUTPATIENT
Start: 2022-02-23 | End: 2022-02-23

## 2022-02-23 RX ORDER — WARFARIN SODIUM 2.5 MG/1
1 TABLET ORAL
Qty: 0 | Refills: 0 | DISCHARGE

## 2022-02-23 RX ORDER — AMLODIPINE BESYLATE 2.5 MG/1
1 TABLET ORAL
Qty: 30 | Refills: 0
Start: 2022-02-23 | End: 2022-03-24

## 2022-02-23 RX ORDER — PANTOPRAZOLE SODIUM 20 MG/1
1 TABLET, DELAYED RELEASE ORAL
Qty: 30 | Refills: 0
Start: 2022-02-23 | End: 2022-03-24

## 2022-02-23 RX ORDER — MAGNESIUM OXIDE 400 MG ORAL TABLET 241.3 MG
800 TABLET ORAL ONCE
Refills: 0 | Status: DISCONTINUED | OUTPATIENT
Start: 2022-02-23 | End: 2022-02-23

## 2022-02-23 RX ORDER — LISINOPRIL 2.5 MG/1
1 TABLET ORAL
Qty: 30 | Refills: 0
Start: 2022-02-23 | End: 2022-03-24

## 2022-02-23 RX ORDER — WARFARIN SODIUM 2.5 MG/1
1 TABLET ORAL
Qty: 30 | Refills: 0
Start: 2022-02-23 | End: 2022-03-24

## 2022-02-23 RX ORDER — MAGNESIUM OXIDE 400 MG ORAL TABLET 241.3 MG
400 TABLET ORAL ONCE
Refills: 0 | Status: COMPLETED | OUTPATIENT
Start: 2022-02-23 | End: 2022-02-23

## 2022-02-23 RX ORDER — LISINOPRIL 2.5 MG/1
1 TABLET ORAL
Qty: 0 | Refills: 0 | DISCHARGE

## 2022-02-23 RX ORDER — AMLODIPINE BESYLATE 2.5 MG/1
1 TABLET ORAL
Qty: 0 | Refills: 0 | DISCHARGE

## 2022-02-23 RX ORDER — ACETAMINOPHEN 500 MG
2 TABLET ORAL
Qty: 56 | Refills: 0
Start: 2022-02-23 | End: 2022-03-01

## 2022-02-23 RX ADMIN — Medication 100 MILLIGRAM(S): at 06:51

## 2022-02-23 RX ADMIN — MAGNESIUM OXIDE 400 MG ORAL TABLET 400 MILLIGRAM(S): 241.3 TABLET ORAL at 05:41

## 2022-02-23 RX ADMIN — LISINOPRIL 20 MILLIGRAM(S): 2.5 TABLET ORAL at 08:38

## 2022-02-23 RX ADMIN — PANTOPRAZOLE SODIUM 40 MILLIGRAM(S): 20 TABLET, DELAYED RELEASE ORAL at 06:51

## 2022-02-23 RX ADMIN — HEPARIN SODIUM 5000 UNIT(S): 5000 INJECTION INTRAVENOUS; SUBCUTANEOUS at 05:41

## 2022-02-23 NOTE — DISCHARGE NOTE PROVIDER - NSDCFUADDINST_GEN_ALL_CORE_FT
You had a MCOT monitor (an external cardiac rhythm monitoring device) placed on your day of discharge.  This helps us monitor your heart while you are out of the hospital for 30 days after discharge. Should your heart go into an abnormal or dangerous rhythm you will receieve a call from the MCOT team and your Structural Heart team of Doctors and PA's will be notified.    1. Keep the monitor within 30 feet of you at all times.  2. When you feel any symptom (chest pain, dizziness, palpitations, weakness, fatigue or anything outside of your normal), press the “Record Symptoms” button on the main phone of your phone  3. Shower or exercise as normal whilewearing the MCOT Patch. Do not swim or take a bath. Patch is water-resistant, not waterproof  4. When the battery is low on the phone or on the device, use the supplied . The monitor will show a warning message when the battery is low.  5. Do not remove the patch from yourskin after you begin monitoring. With normal wear, each patch should last 5 days. To replace the patch follow instructions in the MCOT box with the Patch Guide  6. Any issues with the MCOT device or phone please call Customer Service at [phone].  7. If you have any other questions at all please call the Structural Heart office at [phone]      -Walk daily as tolerated and use your incentive spirometer 10 times every hour while you are awake.     -Please weigh yourself daily. If you notice over a 3 pound weight gain in 3 days, this is a sign you are likely retaining too much fluid. It is imperative you call our right away with unexplained rapid weight gain.      -Please continue to wear the compression stockings given to you in the hospital at home. This is a way to prevent fluid from building up in your legs.     -No driving or strenuous activity/exercise until cleared by your surgeon.    -Gently clean your incisions with unscented/antibacterial soap and water, pat dry.  You may leave them open to air.    -Call your doctor if you have shortness of breath, chest pain not relieved by pain medication, dizziness, fever >100.5, or increased redness or drainage from incisions.    - Take off any remaining dressings tomorrow

## 2022-02-23 NOTE — DISCHARGE NOTE NURSING/CASE MANAGEMENT/SOCIAL WORK - PATIENT PORTAL LINK FT
You can access the FollowMyHealth Patient Portal offered by [facility] by registering at the following website: [email or web address]. By joining [employer]’s FollowMyHealth portal, you will also be able to view your health information using other applications (apps) compatible with our system.

## 2022-02-23 NOTE — DISCHARGE NOTE PROVIDER - HOSPITAL COURSE
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 Shad Ortiz after he presented with increasing fatigue and SMITH over the last 6 months.  Cardiac cath on 11/29/21 showed normal coronaries and ECHO done which confirmed severe AS with BRENDA 0.9cm2, MPG/PPG 44/78mmHg).  He completed pre-procedural evaluation and was deemed a TAVR candidate and was admitted to [facility] on 2/22/22 and underwent TAVR (29mm Armando). Brought to [address] (mini-ICU bed) for recovery. Left groin TVP in place. Left radial arterial line in place. Initial EKG showed sinus with PVCs. . PEPE 186. Rate 61bpm. Labs unremarkable. Hemodynamically stable overnight. On POD # 1 EKG *** TTE obtained showing ***. Per Dr. Thompson, patient medically stable for discharge home with family on 2/23/22.    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 Shda Ortiz after he presented with increasing fatigue and SMITH over the last 6 months.  Cardiac cath on 11/29/21 showed normal coronaries and ECHO done which confirmed severe AS with BRENDA 0.9cm2, MPG/PPG 44/78mmHg).  He completed pre-procedural evaluation and was deemed a TAVR candidate and was admitted to [facility] on 2/22/22 and underwent TAVR (29mm Armando). Brought to [address] (mini-ICU bed) for recovery. Left groin TVP in place. Left radial arterial line in place. Initial EKG showed sinus with PVCs. . PEPE 186. Rate 61bpm. Labs unremarkable. Hemodynamically stable overnight. On POD # 1 EKG NSR with PACs, , PEPE 162, QTC  TTE obtained and reviewed by structural heart team, all findings stable. Final report pending. Per Dr. Thompson, patient medically stable for discharge home with family on 2/23/22.    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 Shad Ortiz after he presented with increasing fatigue and SMITH over the last 6 months.  Cardiac cath on 11/29/21 showed normal coronaries and ECHO done which confirmed severe AS with BRENDA 0.9cm2, MPG/PPG 44/78mmHg).  He completed pre-procedural evaluation and was deemed a TAVR candidate and was admitted to [facility] on 2/22/22 and underwent TAVR (29mm Armando). Brought to [address] (mini-ICU bed) for recovery. Left groin TVP in place. Left radial arterial line in place. Initial EKG showed sinus with PVCs. . PEPE 186. Rate 61bpm. Labs unremarkable. Hemodynamically stable overnight. On POD # 1 EKG NSR with PACs, , PEPE 162, QTC  TTE obtained and reviewed by structural heart team, all findings stable. Per Dr. Thompson, patient medically stable for discharge home with family on 2/23/22.    Post procedure TTE results 2/23/22  CONCLUSIONS:   1. 29 mm Armando 3 Ultra valve is noted in the aortic position. The peak   transvalvular velocity is 2.00 m/s, the mean transvalvular gradient is   8.00 mmHg, and the LVOT/AV velocity ratio is 0.72. The aortic valve area   (estimated via the continuity method) is 2.48 cm². There is trace aortic   regurgitation -probably valvular.   2. Mild tricuspid regurgitation.   3. No other significant valvular disease.   4. Pulmonary artery systolic pressure is 42 mmHg.   5. Normal left and right ventricular size and systolic function.   6. Trivial pericardial effusion.      Over 35 minutes was spent with the patient reviewing the discharge material including medications, follow up appointments, recovery, concerning symptoms, and how to contact their health care providers if they have questions

## 2022-02-23 NOTE — DISCHARGE NOTE PROVIDER - NSDCMRMEDTOKEN_GEN_ALL_CORE_FT
acetaminophen 325 mg oral tablet: 2 tab(s) orally every 6 hours, As Needed -Mild Pain (1 - 3)   amLODIPine 5 mg oral tablet: 1 tab(s) orally once a day  cyanocobalamin 1000 mcg/mL injectable solution:   Feraheme: 510 milligram(s) intravenous  lisinopril 20 mg oral tablet: 1 tab(s) orally once a day  pantoprazole 40 mg oral delayed release tablet: 1 tab(s) orally once a day (before a meal)  warfarin 5 mg oral tablet: 1 tab(s) orally once a day

## 2022-02-23 NOTE — DISCHARGE NOTE PROVIDER - NSDCCPCAREPLAN_GEN_ALL_CORE_FT
PRINCIPAL DISCHARGE DIAGNOSIS  Diagnosis: Aortic stenosis, severe  Assessment and Plan of Treatment:       SECONDARY DISCHARGE DIAGNOSES  Diagnosis: Deep vein thrombosis  Assessment and Plan of Treatment:

## 2022-02-23 NOTE — PROGRESS NOTE ADULT - SUBJECTIVE AND OBJECTIVE BOX
Patient discussed on morning rounds with Dr. Thompson    Operation / Date: 22 TAVR (29mm Armando)    Surgeon: Dr. Thompson    Referring Physician: Dr. Shad Ortiz    SUBJECTIVE ASSESSMENT:  87y Male seen and examined at bedside. Sitting in the chair. Groin incisions c/d/i. Feels ready to go home today.    Hospital Course:  87 year old male, former smoker, with history of HTN, HLD, colon CA, DVT/PE (on Coumadin, last dose 22), chronic HFpEF with Severe AS referred by Dr. Shad Ortiz after he presented with increasing fatigue and SMITH over the last 6 months.  Cardiac cath on 21 showed normal coronaries and ECHO done which confirmed severe AS with BRENDA 0.9cm2, MPG/PPG 44/78mmHg).  He completed pre-procedural evaluation and was deemed a TAVR candidate and was admitted to [facility] on 22 and underwent TAVR (29mm Armando). Brought to [address] [facility] (mini-ICU bed) for recovery. Left groin TVP in place. Left radial arterial line in place. Initial EKG showed sinus with PVCs. . PEPE 186. Rate 61bpm. Labs unremarkable. Hemodynamically stable overnight. On POD # 1 EKG NSR with PACs, , PEPE 162, QTC  TTE obtained and reviewed by structural heart team, all findings stable. Final report pending. Per Dr. Thompson, patient medically stable for discharge home with family on 22    Vital Signs Last 24 Hrs  T(C): 36.4 (2022 09:28), Max: 37.6 (2022 01:01)  T(F): 97.6 (2022 09:28), Max: 99.7 (2022 01:01)  HR: 81 (2022 08:00) (58 - 81)  BP: 132/63 (2022 08:00) (132/63 - 132/63)  BP(mean): 91 (2022 08:00) (91 - 91)  RR: 17 (2022 08:00) (16 - 19)  SpO2: 100% (2022 08:00) (97% - 100%)  I&O's Detail    2022 07:01  -  2022 07:00  --------------------------------------------------------  IN:    IV PiggyBack: 150 mL    Oral Fluid: 390 mL  Total IN: 540 mL    OUT:    Voided (mL): 2520 mL  Total OUT: 2520 mL    Total NET: -1980 mL      PHYSICAL EXAM:  GEN: NAD, looks comfortable  Psych: Mood appropriate  Neuro: A&Ox3.  No focal deficits.  Moving all extremities.   HEENT: No obvious abnormalities  CV: S1S2, regular, no murmurs appreciated.  No carotid bruits.  No JVD  Lungs: Clear B/L.  No wheezing, rales or rhonchi  ABD: Soft, non-tender, non-distended.  +Bowel sounds  EXT: Warm and well perfused.  No peripheral edema noted  Musculoskeletal: Moving all extremities with normal ROM, no joint swelling  PV: Pedal pulses palpable  Incisions: C/D/I        LABS:                        10.2   6.33  )-----------( 151      ( 2022 03:58 )             30.7       COUMADIN:  YES, 5MG AT NIGHT FOR HX DVT    PT/INR - ( 2022 03:58 )   PT: 12.7 sec;   INR: 1.06          PTT - ( 2022 03:58 )  PTT:31.3 sec    02    130<L>  |  99  |  12  ----------------------------<  88  4.3   |  22  |  0.86    Ca    7.8<L>      2022 03:58  Phos  2.9     02-23  Mg     1.8     02-23    TPro  6.0  /  Alb  3.1<L>  /  TBili  1.0  /  DBili  x   /  AST  29  /  ALT  17  /  AlkPhos  67  02-22      Urinalysis Basic - ( 2022 10:30 )    Color: Yellow / Appearance: Clear / S.010 / pH: x  Gluc: x / Ketone: NEGATIVE  / Bili: Negative / Urobili: 0.2 E.U./dL   Blood: x / Protein: NEGATIVE mg/dL / Nitrite: NEGATIVE   Leuk Esterase: NEGATIVE / RBC: x / WBC x   Sq Epi: x / Non Sq Epi: x / Bacteria: x        MEDICATIONS  (STANDING):  amLODIPine   Tablet 5 milliGRAM(s) Oral daily  ceFAZolin   IVPB 2000 milliGRAM(s) IV Intermittent every 8 hours  heparin   Injectable 5000 Unit(s) SubCutaneous every 8 hours  lisinopril 20 milliGRAM(s) Oral daily  pantoprazole    Tablet 40 milliGRAM(s) Oral before breakfast  sodium chloride 0.9% lock flush 3 milliLiter(s) IV Push every 8 hours      Discharge CXR:  22  Findings/  Impression: Heart size within normal limits, thoracic aortic   calcification, TAVR. Lungs and mediastinum are unremarkable. Stable bony   structures.      Discharge ECHO:  22  Final read pending. Reviewed by structural heart team. Findings stable s/p TAVR. Cleared for discharge home   Patient discussed on morning rounds with Dr. Thompson    Operation / Date: 22 TAVR (29mm Armando)    Surgeon: Dr. Thmopson    Referring Physician: Dr. Shad Ortiz    SUBJECTIVE ASSESSMENT:  87y Male seen and examined at bedside. Sitting in the chair. Groin incisions c/d/i. Feels ready to go home today.    Hospital Course:  87 year old male, former smoker, with history of HTN, HLD, colon CA, DVT/PE (on Coumadin, last dose 22), chronic HFpEF with Severe AS referred by Dr. Shad Ortiz after he presented with increasing fatigue and SMITH over the last 6 months.  Cardiac cath on 21 showed normal coronaries and ECHO done which confirmed severe AS with BRENDA 0.9cm2, MPG/PPG 44/78mmHg).  He completed pre-procedural evaluation and was deemed a TAVR candidate and was admitted to [facility] on 22 and underwent TAVR (29mm Armando). Brought to [facility] (mini-ICU bed) for recovery. Left groin TVP in place. Left radial arterial line in place. Initial EKG showed sinus with PVCs. . PEPE 186. Rate 61bpm. Labs unremarkable. Hemodynamically stable overnight. On POD # 1 EKG NSR with PACs, , PEPE 162, QTC  TTE obtained  - results below, findings stable. Per Dr. Thompson, patient medically stable for discharge home with family on 22      Vital Signs Last 24 Hrs  T(C): 36.4 (2022 09:28), Max: 37.6 (2022 01:01)  T(F): 97.6 (2022 09:28), Max: 99.7 (2022 01:01)  HR: 81 (2022 08:00) (58 - 81)  BP: 132/63 (2022 08:00) (132/63 - 132/63)  BP(mean): 91 (2022 08:00) (91 - 91)  RR: 17 (2022 08:00) (16 - 19)  SpO2: 100% (2022 08:00) (97% - 100%)  I&O's Detail    2022 07:01  -  2022 07:00  --------------------------------------------------------  IN:    IV PiggyBack: 150 mL    Oral Fluid: 390 mL  Total IN: 540 mL    OUT:    Voided (mL): 2520 mL  Total OUT: 2520 mL    Total NET: -1980 mL      PHYSICAL EXAM:  GEN: NAD, looks comfortable  Psych: Mood appropriate  Neuro: A&Ox3.  No focal deficits.  Moving all extremities.   HEENT: No obvious abnormalities  CV: S1S2, regular, no murmurs appreciated.  No carotid bruits.  No JVD  Lungs: Clear B/L.  No wheezing, rales or rhonchi  ABD: Soft, non-tender, non-distended.  +Bowel sounds  EXT: Warm and well perfused.  No peripheral edema noted  Musculoskeletal: Moving all extremities with normal ROM, no joint swelling  PV: Pedal pulses palpable  Incisions: C/D/I        LABS:                        10.2   6.33  )-----------( 151      ( 2022 03:58 )             30.7       COUMADIN:  YES, 5MG AT NIGHT FOR HX DVT    PT/INR - ( 2022 03:58 )   PT: 12.7 sec;   INR: 1.06          PTT - ( 2022 03:58 )  PTT:31.3 sec        130<L>  |  99  |  12  ----------------------------<  88  4.3   |  22  |  0.86    Ca    7.8<L>      2022 03:58  Phos  2.9     02-23  Mg     1.8     -23    TPro  6.0  /  Alb  3.1<L>  /  TBili  1.0  /  DBili  x   /  AST  29  /  ALT  17  /  AlkPhos  67  02-22      Urinalysis Basic - ( 2022 10:30 )    Color: Yellow / Appearance: Clear / S.010 / pH: x  Gluc: x / Ketone: NEGATIVE  / Bili: Negative / Urobili: 0.2 E.U./dL   Blood: x / Protein: NEGATIVE mg/dL / Nitrite: NEGATIVE   Leuk Esterase: NEGATIVE / RBC: x / WBC x   Sq Epi: x / Non Sq Epi: x / Bacteria: x        MEDICATIONS  (STANDING):  amLODIPine   Tablet 5 milliGRAM(s) Oral daily  ceFAZolin   IVPB 2000 milliGRAM(s) IV Intermittent every 8 hours  heparin   Injectable 5000 Unit(s) SubCutaneous every 8 hours  lisinopril 20 milliGRAM(s) Oral daily  pantoprazole    Tablet 40 milliGRAM(s) Oral before breakfast  sodium chloride 0.9% lock flush 3 milliLiter(s) IV Push every 8 hours      Discharge CXR:  22  Findings/  Impression: Heart size within normal limits, thoracic aortic   calcification, TAVR. Lungs and mediastinum are unremarkable. Stable bony   structures.      Discharge ECHO:  22  CONCLUSIONS:   1. 29 mm Armando 3 Ultra valve is noted in the aortic position. The peak   transvalvular velocity is 2.00 m/s, the mean transvalvular gradient is   8.00 mmHg, and the LVOT/AV velocity ratio is 0.72. The aortic valve area   (estimated via the continuity method) is 2.48 cm². There is trace aortic   regurgitation -probably valvular.   2. Mild tricuspid regurgitation.   3. No other significant valvular disease.   4. Pulmonary artery systolic pressure is 42 mmHg.   5. Normal left and right ventricular size and systolic function.   6. Trivial pericardial effusion.

## 2022-02-23 NOTE — DISCHARGE NOTE PROVIDER - NSDCFUADDAPPT_GEN_ALL_CORE_FT
- You have an appointment with Dr. Rya at the [facility] Friday, February 25th at 3:15pm

## 2022-02-23 NOTE — PROGRESS NOTE ADULT - ASSESSMENT
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 Shad Ortiz after he presented with increasing fatigue and SMITH over the last 6 months.  Cardiac cath on 11/29/21 showed normal coronaries and ECHO done which confirmed severe AS with BRENDA 0.9cm2, MPG/PPG 44/78mmHg).  He completed pre-procedural evaluation and was deemed a TAVR candidate and was admitted to [facility] on 2/22/22 and underwent TAVR (29mm Armando). Brought to [facility] (mini-ICU bed) for recovery. Left groin TVP in place. Left radial arterial line in place. Initial EKG showed sinus with PVCs. . PEPE 186. Rate 61bpm. Labs unremarkable. Hemodynamically stable overnight. On POD # 1 EKG NSR with PACs, , PEPE 162, QTC  TTE obtained and reviewed by structural heart team, all findings stable. Final report pending. Per Dr. Thompson, patient medically stable for discharge home with family on 2/23/22    A/P:  Neurovascular:   - No delirium. Pain well controlled    Cardiovascular:   - POD # 1 from TAVR  - EKG 2/23 NSR, no arrythmia, QTC and PEPE WNL  - Post procedure TTE performed 2/23, stable  - Hemodynamically stable. HR controlled, NSR 70s  - TVP removed 2/22 with no complications  - Hx of HTN, BP controlled  - Restart home medications when needed  - Continue Coumadin 5mg. Will follow up at coumadin clinic 2/25    Respiratory:   - 02 Sat = 98% on RA.    GI:   - Stable.  - Tolerating DASH diet  - Continue GI PPX with protonix    Renal / :  - BUN/Cr stable  - Continue to monitor renal function.  - Monitor I/O's.  - Replete electrolytes PRN    Endocrine:    -A1c 4.8  -TSH.    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 Shad Ortiz after he presented with increasing fatigue and SMITH over the last 6 months.  Cardiac cath on 11/29/21 showed normal coronaries and ECHO done which confirmed severe AS with BRENDA 0.9cm2, MPG/PPG 44/78mmHg).  He completed pre-procedural evaluation and was deemed a TAVR candidate and was admitted to [facility] on 2/22/22 and underwent TAVR (29mm Armando). Brought to [facility] (mini-ICU bed) for recovery. Left groin TVP in place. Left radial arterial line in place. Initial EKG showed sinus with PVCs. . PEPE 186. Rate 61bpm. Labs unremarkable. Hemodynamically stable overnight. On POD # 1 EKG NSR with PACs, , PEPE 162, QTC  TTE obtained and reviewed by structural heart team, all findings stable. Per Dr. Thompson, patient medically stable for discharge home with family on 2/23/22    A/P:  Neurovascular:   - No delirium. Pain well controlled    Cardiovascular:   - POD # 1 from TAVR  - EKG 2/23 NSR, no arrythmia, QTC and PEPE WNL  - Post procedure TTE performed 2/23, stable  - Hemodynamically stable. HR controlled, NSR 70s  - TVP removed 2/22 with no complications  - Hx of HTN, BP controlled  - Restart home medications when needed  - Continue Coumadin 5mg. Will follow up at coumadin clinic 2/25    Respiratory:   - 02 Sat = 98% on RA.    GI:   - Stable.  - Tolerating DASH diet  - Continue GI PPX with protonix    Renal / :  - BUN/Cr stable  - Continue to monitor renal function.  - Monitor I/O's.  - Replete electrolytes PRN    Endocrine:    -A1c 4.8  -TSH.    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 Shad Ortiz after he presented with increasing fatigue and SMITH over the last 6 months.  Cardiac cath on 11/29/21 showed normal coronaries and ECHO done which confirmed severe AS with BRENDA 0.9cm2, MPG/PPG 44/78mmHg).  He completed pre-procedural evaluation and was deemed a TAVR candidate and was admitted to [facility] on 2/22/22 and underwent TAVR (29mm Armando). Brought to [facility] (mini-ICU bed) for recovery. Left groin TVP in place. Left radial arterial line in place. Initial EKG showed sinus with PVCs. . PEPE 186. Rate 61bpm. Labs unremarkable. Hemodynamically stable overnight. On POD # 1 EKG NSR with PACs, , PEPE 162, QTC  TTE obtained and reviewed by structural heart team, all findings stable. Per Dr. Thompson, patient medically stable for discharge home with family on 2/23/22    A/P:  Neurovascular:   - No delirium. Pain well controlled    Cardiovascular:   - POD # 1 from TAVR  - EKG 2/23 NSR, no arrythmia, QTC and PEPE WNL  - Being discharged with [facility]  - Post procedure TTE performed 2/23, stable  - Hemodynamically stable. HR controlled, NSR 70s  - TVP removed 2/22 with no complications  - Hx of HTN, BP controlled  - Restart home medications when needed  - Continue Coumadin 5mg. Will follow up at coumadin clinic 2/25    Respiratory:   - 02 Sat = 98% on RA.    GI:   - Stable.  - Tolerating DASH diet  - Continue GI PPX with protonix    Renal / :  - BUN/Cr stable  - Continue to monitor renal function.  - Monitor I/O's.  - Replete electrolytes PRN    Endocrine:    -A1c 4.8  -TSH.    Hematologic: HX DVT/PE, on coumadin monotherapy  - H/H stable with no obvious signs of bleeding  - Coagulation Panel sent, PTT 48, INR 1.0  - Continuing coumadin    ID:  - Pt remains afebrile with no elevation in WBC or signs of infection    Disposition:  - Home today

## 2022-02-23 NOTE — DISCHARGE NOTE NURSING/CASE MANAGEMENT/SOCIAL WORK - NSDCFUADDAPPT_GEN_ALL_CORE_FT
- You have an appointment with Dr. Ray at the [facility] Friday, February 25th at 3:15pm

## 2022-02-23 NOTE — DISCHARGE NOTE NURSING/CASE MANAGEMENT/SOCIAL WORK - NSDCPEFALRISK_GEN_ALL_CORE
For information on Fall & Injury Prevention, visit: [email or web address] OR  [email or web address] OR  https://www.cdc.gov/steadi/patient.html

## 2022-02-23 NOTE — DISCHARGE NOTE PROVIDER - CARE PROVIDER_API CALL
Lobo Thompson)  Cardiovascular Surgery  [address], [place] [address]  Phone: [phone]  Fax: [phone]  Follow Up Time: 1 week    Johanna Mckeon)  Cardiovascular Disease; Internal Medicine; Interventional Cardiology  [address]  [place] [address]  Phone: [phone]  Fax: [phone]  Follow Up Time: 2 weeks

## 2022-02-23 NOTE — DISCHARGE NOTE PROVIDER - PROVIDER TOKENS
PROVIDER:[TOKEN:[[id number]],FOLLOWUP:[1 week]],PROVIDER:[TOKEN:[[id number]],FOLLOWUP:[2 weeks]]

## 2022-02-25 ENCOUNTER — NON-APPOINTMENT (OUTPATIENT)
Age: 87
End: 2022-02-25

## 2022-02-28 DIAGNOSIS — I35.0 NONRHEUMATIC AORTIC (VALVE) STENOSIS: ICD-10-CM

## 2022-02-28 DIAGNOSIS — I11.0 HYPERTENSIVE HEART DISEASE WITH HEART FAILURE: ICD-10-CM

## 2022-02-28 DIAGNOSIS — E78.5 HYPERLIPIDEMIA, UNSPECIFIED: ICD-10-CM

## 2022-02-28 DIAGNOSIS — Z87.891 PERSONAL HISTORY OF NICOTINE DEPENDENCE: ICD-10-CM

## 2022-02-28 DIAGNOSIS — Z85.038 PERSONAL HISTORY OF OTHER MALIGNANT NEOPLASM OF LARGE INTESTINE: ICD-10-CM

## 2022-02-28 DIAGNOSIS — Z86.718 PERSONAL HISTORY OF OTHER VENOUS THROMBOSIS AND EMBOLISM: ICD-10-CM

## 2022-02-28 DIAGNOSIS — I49.3 VENTRICULAR PREMATURE DEPOLARIZATION: ICD-10-CM

## 2022-02-28 DIAGNOSIS — Z00.6 ENCOUNTER FOR EXAMINATION FOR NORMAL COMPARISON AND CONTROL IN CLINICAL RESEARCH PROGRAM: ICD-10-CM

## 2022-02-28 DIAGNOSIS — I50.32 CHRONIC DIASTOLIC (CONGESTIVE) HEART FAILURE: ICD-10-CM

## 2022-02-28 DIAGNOSIS — Z79.01 LONG TERM (CURRENT) USE OF ANTICOAGULANTS: ICD-10-CM

## 2022-02-28 DIAGNOSIS — J44.9 CHRONIC OBSTRUCTIVE PULMONARY DISEASE, UNSPECIFIED: ICD-10-CM

## 2022-03-21 ENCOUNTER — APPOINTMENT (OUTPATIENT)
Dept: CARDIOTHORACIC SURGERY | Facility: CLINIC | Age: 87
End: 2022-03-21
Payer: COMMERCIAL

## 2022-03-21 DIAGNOSIS — I35.0 NONRHEUMATIC AORTIC (VALVE) STENOSIS: ICD-10-CM

## 2022-03-21 DIAGNOSIS — I50.32 CHRONIC DIASTOLIC (CONGESTIVE) HEART FAILURE: ICD-10-CM

## 2022-03-21 PROCEDURE — 99213 OFFICE O/P EST LOW 20 MIN: CPT | Mod: 95

## 2022-03-22 PROBLEM — I50.32 CHRONIC DIASTOLIC HEART FAILURE: Status: ACTIVE | Noted: 2022-03-22

## 2022-03-22 PROBLEM — I35.0 AORTIC STENOSIS: Status: ACTIVE | Noted: 2021-12-10

## 2022-03-24 ENCOUNTER — TRANSCRIPTION ENCOUNTER (OUTPATIENT)
Age: 87
End: 2022-03-24

## 2022-03-30 NOTE — HISTORY OF PRESENT ILLNESS
[FreeTextEntry1] : This visit was provided via telehealth using real-time 2-way audio visual technology. The patient, BERNARD CHENEY, was located at home, [address] [place] [address], at the time of the visit. The provider was located at [address] [place] [address]. The patient, BERNARD CHENEY, SHAHRAM WOLF, and Dr. Thompson all participated in the telehealth encounter. Verbal consent was obtained on 03/21/2022 by BERNARD RINALDIYLOCK.\par  \par \par 87 year old former smoking male with a history of hypertension, hyperlipidemia, history of Colon Ca, history of DVT (currently on warfarin), chronic diastolic heart failure with severe aortic stenosis who underwent a transfemoral TAVR on 2/22/2022 (Armando 3, 29mm) who presents for follow up after discharge.\par \par The procedure was uncomplicated and the patient was discharged on post op day 1.\par \par Since discharge, the patient states he has been slowly improving.  He denies chest pain, shortness of breath at rest, dizziness, syncope, palpitations, orthopnea, PND, or LE edema.  He admits to some fatigue and dyspnea on exertion that has been getting better over the last week.

## 2023-05-19 ENCOUNTER — TRANSCRIPTION ENCOUNTER (OUTPATIENT)
Age: 88
End: 2023-05-19

## (undated) DEVICE — ELCTR ZOLL DEFIBRILLATOR PAD NO REPLACEMENT

## (undated) DEVICE — TUBING EXTENSION HI PRESSURE FLEX 48"

## (undated) DEVICE — DRAPE PROBE COVER 5" X 96"

## (undated) DEVICE — PACING CABLE (BROWN) A/V TEMP SCREW DOWN 12FT

## (undated) DEVICE — SUT STAINLESS STEEL 6 4-18" CCS

## (undated) DEVICE — SYR MED A2000 SYRINGE KIT ACIST REUS

## (undated) DEVICE — SUT DOUBLE 6 WIRE STERNAL

## (undated) DEVICE — POSITIONER FOAM EGG CRATE ULNAR 2PCS (PINK)

## (undated) DEVICE — RIGID ADULT SUCKER

## (undated) DEVICE — FOLEY TRAY 16FR 5CC LF LUBRISIL ADVANCE TEMP CLOSED

## (undated) DEVICE — DRAPE IOBAN 33" X 23"

## (undated) DEVICE — SUT NUROLON 1 18" OS-8 (POP-OFF)

## (undated) DEVICE — DRAPE OR CAMERA COVER

## (undated) DEVICE — DRSG MEPILEX 10 X 25CM (4 X 10") AG

## (undated) DEVICE — PREP SCRUB BRUSH W CHG 4%

## (undated) DEVICE — SUT PROLENE 6-0 30" RB-2

## (undated) DEVICE — MANIFOLD ANGIO AUTOMD TRNDCR

## (undated) DEVICE — PACK PROC CV DRAPE

## (undated) DEVICE — CATH NG SALEM SUMP 16FR

## (undated) DEVICE — DEVICE INFLATION DISPOSABLE

## (undated) DEVICE — NDL PERCU ECHOTIP 21G X 4CM

## (undated) DEVICE — WARMING BLANKET FULL UNDERBODY

## (undated) DEVICE — MARKING PEN W RULER

## (undated) DEVICE — PACK HYBRID LHH

## (undated) DEVICE — SUT VICRYL 2-0 27" CT-1

## (undated) DEVICE — SUT TICRON 2-0 36" CV-316 DA

## (undated) DEVICE — DRAPE SURGICAL #1010

## (undated) DEVICE — SUT PROLENE 3-0 36" SH-1

## (undated) DEVICE — Device

## (undated) DEVICE — DRAPE ULTRASOUND TRANSDUCER COVER

## (undated) DEVICE — TOURNIQUET SET 12FR (1 RED, 1 BLUE, 3 CLEAR, 1 SNARE) 7"

## (undated) DEVICE — CATH CV TRAY INSR ST UNIV

## (undated) DEVICE — TUBING SUCTION NONCONDUCTIVE 6MM X 12FT

## (undated) DEVICE — SUT VICRYL 1 36" CTX UNDYED

## (undated) DEVICE — CHEST DRAIN OASIS DRY SUCTION WATER SEAL

## (undated) DEVICE — SYS DEL CONTROLLER ANGIOTOUCH

## (undated) DEVICE — SUT SILK 5-0 60" TIES

## (undated) DEVICE — SUT PROLENE 4-0 36" RB-1

## (undated) DEVICE — CATH CARDIAC RT CUSET 601201319

## (undated) DEVICE — SUT VICRYL 4-0 18" PS-2 UNDYED

## (undated) DEVICE — SUT PLEDGET SOFT MEDIUM 1/4" X 1/8" X 1/16" X6

## (undated) DEVICE — DRAPE SLUSH / WARMER 44 X 66"

## (undated) DEVICE — SUT VICRYL 0 27" CT

## (undated) DEVICE — PACK OPEN HEART LNX

## (undated) DEVICE — SUMP INTRACARDIAC/PERICARDIAL 20FR 1/4" ADULT

## (undated) DEVICE — SUT ETHIBOND 3-0 36" RB-1

## (undated) DEVICE — SUT PROLENE 5-0 24" RB-1

## (undated) DEVICE — SUT SILK 2-0 18" SH (POP-OFF)

## (undated) DEVICE — SUT STAINLESS STEEL 7 4-18" CCS

## (undated) DEVICE — SUT HOLDER INSERT FOR OCTOBASE STERNAL RETRACTOR

## (undated) DEVICE — SUT PLEDGET 9MM X 4MM X 1.5MM

## (undated) DEVICE — DRSG BIOPATCH DISK W CHG 1" W 4.0MM HOLE

## (undated) DEVICE — DRSG TRACH DRAINAGE 4X4